# Patient Record
Sex: FEMALE | Employment: FULL TIME | URBAN - METROPOLITAN AREA
[De-identification: names, ages, dates, MRNs, and addresses within clinical notes are randomized per-mention and may not be internally consistent; named-entity substitution may affect disease eponyms.]

---

## 2023-07-14 ENCOUNTER — LAB (OUTPATIENT)
Dept: LAB | Facility: LAB | Age: 43
End: 2023-07-14
Payer: COMMERCIAL

## 2023-07-14 LAB — TOBACCO SCREEN, URINE: NEGATIVE

## 2023-11-16 ENCOUNTER — OFFICE VISIT (OUTPATIENT)
Dept: PRIMARY CARE | Facility: CLINIC | Age: 43
End: 2023-11-16
Payer: COMMERCIAL

## 2023-11-16 VITALS
SYSTOLIC BLOOD PRESSURE: 140 MMHG | DIASTOLIC BLOOD PRESSURE: 68 MMHG | BODY MASS INDEX: 30.26 KG/M2 | TEMPERATURE: 98.1 F | OXYGEN SATURATION: 97 % | WEIGHT: 170.8 LBS | HEIGHT: 63 IN | HEART RATE: 71 BPM

## 2023-11-16 DIAGNOSIS — R30.0 DYSURIA: ICD-10-CM

## 2023-11-16 DIAGNOSIS — F32.A ANXIETY AND DEPRESSION: ICD-10-CM

## 2023-11-16 DIAGNOSIS — F41.9 ANXIETY AND DEPRESSION: ICD-10-CM

## 2023-11-16 DIAGNOSIS — Z00.00 ANNUAL PHYSICAL EXAM: Primary | ICD-10-CM

## 2023-11-16 DIAGNOSIS — K21.9 GASTROESOPHAGEAL REFLUX DISEASE WITHOUT ESOPHAGITIS: ICD-10-CM

## 2023-11-16 DIAGNOSIS — I10 BENIGN HYPERTENSION: ICD-10-CM

## 2023-11-16 LAB
25(OH)D3 SERPL-MCNC: 11 NG/ML (ref 30–100)
ALBUMIN SERPL BCP-MCNC: 4.8 G/DL (ref 3.4–5)
ALP SERPL-CCNC: 52 U/L (ref 33–110)
ALT SERPL W P-5'-P-CCNC: 37 U/L (ref 7–45)
ANION GAP SERPL CALC-SCNC: 17 MMOL/L (ref 10–20)
AST SERPL W P-5'-P-CCNC: 25 U/L (ref 9–39)
BILIRUB SERPL-MCNC: 0.7 MG/DL (ref 0–1.2)
BUN SERPL-MCNC: 11 MG/DL (ref 6–23)
CALCIUM SERPL-MCNC: 9.7 MG/DL (ref 8.6–10.6)
CHLORIDE SERPL-SCNC: 102 MMOL/L (ref 98–107)
CHOLEST SERPL-MCNC: 201 MG/DL (ref 0–199)
CHOLESTEROL/HDL RATIO: 4.4
CO2 SERPL-SCNC: 24 MMOL/L (ref 21–32)
CREAT SERPL-MCNC: 0.71 MG/DL (ref 0.5–1.05)
ERYTHROCYTE [DISTWIDTH] IN BLOOD BY AUTOMATED COUNT: 11.9 % (ref 11.5–14.5)
GFR SERPL CREATININE-BSD FRML MDRD: >90 ML/MIN/1.73M*2
GLUCOSE SERPL-MCNC: 81 MG/DL (ref 74–99)
HCT VFR BLD AUTO: 38.8 % (ref 36–46)
HDLC SERPL-MCNC: 45.6 MG/DL
HGB BLD-MCNC: 12.7 G/DL (ref 12–16)
LDLC SERPL CALC-MCNC: 125 MG/DL
MCH RBC QN AUTO: 29.3 PG (ref 26–34)
MCHC RBC AUTO-ENTMCNC: 32.7 G/DL (ref 32–36)
MCV RBC AUTO: 90 FL (ref 80–100)
NON HDL CHOLESTEROL: 155 MG/DL (ref 0–149)
NRBC BLD-RTO: 0 /100 WBCS (ref 0–0)
PLATELET # BLD AUTO: 328 X10*3/UL (ref 150–450)
POTASSIUM SERPL-SCNC: 4.2 MMOL/L (ref 3.5–5.3)
PROT SERPL-MCNC: 7.9 G/DL (ref 6.4–8.2)
RBC # BLD AUTO: 4.33 X10*6/UL (ref 4–5.2)
SODIUM SERPL-SCNC: 139 MMOL/L (ref 136–145)
TRIGL SERPL-MCNC: 150 MG/DL (ref 0–149)
TSH SERPL-ACNC: 3.03 MIU/L (ref 0.44–3.98)
VLDL: 30 MG/DL (ref 0–40)
WBC # BLD AUTO: 7.8 X10*3/UL (ref 4.4–11.3)

## 2023-11-16 PROCEDURE — 1036F TOBACCO NON-USER: CPT | Performed by: NURSE PRACTITIONER

## 2023-11-16 PROCEDURE — 80053 COMPREHEN METABOLIC PANEL: CPT

## 2023-11-16 PROCEDURE — 3078F DIAST BP <80 MM HG: CPT | Performed by: NURSE PRACTITIONER

## 2023-11-16 PROCEDURE — 85027 COMPLETE CBC AUTOMATED: CPT

## 2023-11-16 PROCEDURE — 99396 PREV VISIT EST AGE 40-64: CPT | Performed by: NURSE PRACTITIONER

## 2023-11-16 PROCEDURE — 3077F SYST BP >= 140 MM HG: CPT | Performed by: NURSE PRACTITIONER

## 2023-11-16 PROCEDURE — 82306 VITAMIN D 25 HYDROXY: CPT

## 2023-11-16 PROCEDURE — 36415 COLL VENOUS BLD VENIPUNCTURE: CPT

## 2023-11-16 PROCEDURE — 83036 HEMOGLOBIN GLYCOSYLATED A1C: CPT

## 2023-11-16 PROCEDURE — 84443 ASSAY THYROID STIM HORMONE: CPT

## 2023-11-16 PROCEDURE — 80061 LIPID PANEL: CPT

## 2023-11-16 RX ORDER — FAMOTIDINE 20 MG/1
20 TABLET, FILM COATED ORAL NIGHTLY PRN
COMMUNITY

## 2023-11-16 ASSESSMENT — ANXIETY QUESTIONNAIRES
2. NOT BEING ABLE TO STOP OR CONTROL WORRYING: MORE THAN HALF THE DAYS
1. FEELING NERVOUS, ANXIOUS, OR ON EDGE: MORE THAN HALF THE DAYS
GAD7 TOTAL SCORE: 11
5. BEING SO RESTLESS THAT IT IS HARD TO SIT STILL: NOT AT ALL
7. FEELING AFRAID AS IF SOMETHING AWFUL MIGHT HAPPEN: MORE THAN HALF THE DAYS
6. BECOMING EASILY ANNOYED OR IRRITABLE: SEVERAL DAYS
3. WORRYING TOO MUCH ABOUT DIFFERENT THINGS: MORE THAN HALF THE DAYS
IF YOU CHECKED OFF ANY PROBLEMS ON THIS QUESTIONNAIRE, HOW DIFFICULT HAVE THESE PROBLEMS MADE IT FOR YOU TO DO YOUR WORK, TAKE CARE OF THINGS AT HOME, OR GET ALONG WITH OTHER PEOPLE: SOMEWHAT DIFFICULT
4. TROUBLE RELAXING: MORE THAN HALF THE DAYS

## 2023-11-16 ASSESSMENT — PATIENT HEALTH QUESTIONNAIRE - PHQ9
1. LITTLE INTEREST OR PLEASURE IN DOING THINGS: MORE THAN HALF THE DAYS
4. FEELING TIRED OR HAVING LITTLE ENERGY: NEARLY EVERY DAY
8. MOVING OR SPEAKING SO SLOWLY THAT OTHER PEOPLE COULD HAVE NOTICED. OR THE OPPOSITE, BEING SO FIGETY OR RESTLESS THAT YOU HAVE BEEN MOVING AROUND A LOT MORE THAN USUAL: SEVERAL DAYS
5. POOR APPETITE OR OVEREATING: MORE THAN HALF THE DAYS
SUM OF ALL RESPONSES TO PHQ QUESTIONS 1-9: 17
3. TROUBLE FALLING OR STAYING ASLEEP OR SLEEPING TOO MUCH: MORE THAN HALF THE DAYS
9. THOUGHTS THAT YOU WOULD BE BETTER OFF DEAD, OR OF HURTING YOURSELF: NOT AT ALL
10. IF YOU CHECKED OFF ANY PROBLEMS, HOW DIFFICULT HAVE THESE PROBLEMS MADE IT FOR YOU TO DO YOUR WORK, TAKE CARE OF THINGS AT HOME, OR GET ALONG WITH OTHER PEOPLE: SOMEWHAT DIFFICULT
SUM OF ALL RESPONSES TO PHQ9 QUESTIONS 1 AND 2: 4
6. FEELING BAD ABOUT YOURSELF - OR THAT YOU ARE A FAILURE OR HAVE LET YOURSELF OR YOUR FAMILY DOWN: NEARLY EVERY DAY
7. TROUBLE CONCENTRATING ON THINGS, SUCH AS READING THE NEWSPAPER OR WATCHING TELEVISION: MORE THAN HALF THE DAYS
2. FEELING DOWN, DEPRESSED OR HOPELESS: MORE THAN HALF THE DAYS

## 2023-11-16 ASSESSMENT — PAIN SCALES - GENERAL: PAINLEVEL: 0-NO PAIN

## 2023-11-16 NOTE — ASSESSMENT & PLAN NOTE
Self stopped Amlodipine 10 mg  Pressures at home and record, bring to next visit  Manage sodium  Handouts provided and reviewed with patient, discussed

## 2023-11-16 NOTE — PATIENT INSTRUCTIONS
Health Maintenance  Continue to follow a healthy diet with fruits and vegetables at most meals.  Daily exercise is recommended as well as adding weights 3 times a week to maintain muscle mass and for bone health.  It is recommended you drink 6 to 8 glasses of water daily, more when you are exerting yourself or in hot weather.  Make sure you follow the health screening guidelines and keep up to date on your immunizations!    GERD or acid reflux occurs when stomach contents back up into the esophagus.Symptoms can include heartburn, chest pain, pain with swallowing, stomach pain, nausea/vomiting, and a sense of a lump in the throat.   Treatment can include medication and/or lifestyle changes such as;   Losing weight if you are overweight  Avoiding foods that trigger symptoms such as caffeine, chocolate, alcohol, citrus, fatty foods, spicy foods, chocolate  Quitting smoking, saliva helps neutralize refluxed acid but  smoking reduces the amount of saliva in the mouth and throat.It also causes coughing which aggravates reflux.  Avoid late meals  Avoid tight clothing around the abdomen  Raise the head of your bed 6 to 8 inches  Avoid laying down 2 hours from mealtime      Anxiety  Experiencing occasional anxiety is a normal part of life.   People with anxiety disorders frequently have intense, excessive and persistent worry and fear about everyday situations. Often, anxiety disorders involve repeated episodes of sudden feelings of intense anxiety and fear or terror that reach a peak within minutes (panic attacks).    These feelings of anxiety and panic interfere with daily activities, are difficult to control, are out of proportion to the actual danger and can last a long time. You may avoid places or situations to prevent these feelings. Symptoms may start during childhood or the teen years and continue into adulthood.    Common anxiety signs and symptoms include:  Feeling nervous, restless or tense  Having a sense of  impending danger, panic or doom  Having an increased heart rate  Breathing rapidly (hyperventilation), Sweating, Trembling  Feeling weak or tired  Trouble concentrating or thinking about anything other than the present worry, Having trouble sleeping  Experiencing gastrointestinal (GI) problems  Having difficulty controlling worry  Having the urge to avoid things that trigger anxiety    Causes  The causes of anxiety disorders aren't fully understood. Life experiences such as traumatic events appear to trigger anxiety disorders in people who are already prone to anxiety. Inherited traits also can be a factor.  For some people, anxiety may be linked to an underlying health issue.     Complications  Having an anxiety disorder does more than make you worry. It can also lead to, or worsen, other mental and physical conditions, such as:  Depression (which often occurs with an anxiety disorder) or other mental health disorders  Substance misuse  Trouble sleeping (insomnia)  Digestive or bowel problems  Headaches and chronic pain  Social isolation  Problems functioning at school or work  Poor quality of life  Suicide    Prevention  There's no way to predict for certain what will cause someone to develop an anxiety disorder, but you can take steps to reduce the impact of symptoms if you're anxious:    Get help early. Anxiety, can be harder to treat if you wait.  Stay active.   Participate in activities that you enjoy and that make you feel good about yourself.   Enjoy social interaction and caring relationships, which can lessen your worries.  Avoid alcohol or drug use.     Treatment  The two main treatments for anxiety disorders are psychotherapy and medications. You may benefit most from a combination of the two. It may take some trial and error to discover which treatments work best for you.    Psychotherapy  Also known as talk therapy or psychological counseling  Cognitive behavioral therapy (CBT) is the most effective  form of psychotherapy for anxiety disorders. Generally a short-term treatment, CBT focuses on teaching you specific skills to improve your symptoms and gradually return to the activities you've avoided because of anxiety.    Medications  Several types of medications are used to help relieve symptoms, depending on the type of anxiety disorder you have and whether you also have other mental or physical health issues. For example:  Certain antidepressants are also used to treat anxiety disorders.  An anti-anxiety medication called buspirone may be prescribed.  In limited circumstances, your doctor may prescribe other types of medications

## 2023-11-16 NOTE — ASSESSMENT & PLAN NOTE
Hx of Zoloft, not currently taking  CBT  Handouts provided and reviewed with patient, discussed  Revisit at follow up

## 2023-11-16 NOTE — PROGRESS NOTES
"Subjective   Patient ID: Monalisa Au is a 43 y.o. female who presents for New Patient Visit and Annual Exam.    HPI  Pleasant 44 y/o female with PMH HTN, Anxiety, HSV, gestational diabetes, GERD (H pylori IgG positive s/p triple therapy resolved)  presents for Annual and to establish care  [place] [facility] [place]    Current concerns include  Dysuria, urinary frequency, discomfort at times, denies blood or discharge. Often wakes up to urinate over last few weeks    HTN- had been prescribed Amlodipine in the past, reports she stopped taking it 1 month ago as she isn't sure she really has hypertension. Takes pressures at home and running in the 120 systolic. Denies chest pain, sob, edema.     Anxiety-Endorses palpitations attributed to anxiety. Cardiac workup completed, Holter monitor completed 10/24/2022, noted occasional PAC's. Reports she does not take medication however EMR review shows Zoloft started in 2021. Denies SI, HI, sleeps well    GERD- (H pylori IgG positive s/p triple therapy resolved) EGD 8/8/2023 with Dr Prater, negative. Treated for reflux with pepcid which she reports works well.  Coffee a trigger. Still with gas although less frequent    Lives with SO  Works as [employer] for , remote  2  Kids ages 18, 9    Diet \"eat everything\"  Little fruit or vegetables  Caffeine no, tries to avoid, worsens gerd  Water 40 oz  Exercise recently joined gym, PF, 3 x week  Non-smoker  Alcohol  No      Eye exam 2022  Dental exam 2021  Gyn Hx abnormal pap 2020  Colonoscopy no  Mammogram Feb 2023    Family history Mom, Dad healthy  Half sister Ovarian cancer         Review of Systems  Review of Systems   Constitutional: Negative.    HENT: Negative.     Respiratory: Negative.     Cardiovascular: Negative.    Gastrointestinal: Negative.    Genitourinary: Negative.    Musculoskeletal: Negative.    Psychiatric/Behavioral: Negative.     All other systems reviewed and are negative.    .vsVisit Vitals  /68 (BP " "Location: Left arm, Patient Position: Sitting, BP Cuff Size: Adult)   Pulse 71   Temp 36.7 °C (98.1 °F) (Temporal)   Ht 1.588 m (5' 2.5\")   Wt 77.5 kg (170 lb 12.8 oz)   LMP 10/18/2023 (Approximate)   SpO2 97%   BMI 30.74 kg/m²   OB Status Having periods   Smoking Status Never   BSA 1.85 m²         Objective   Physical Exam  Physical Exam  Vitals reviewed.   Constitutional:       General: She is active.   HENT:      Head: Normocephalic and atraumatic.   Eyes:      Extraocular Movements: Extraocular movements intact.      Pupils: Pupils are equal, round, and reactive to light.   Cardiovascular:      Rate and Rhythm: Normal rate and regular rhythm.   Pulmonary:      Effort: Pulmonary effort is normal.      Breath sounds: Normal breath sounds.   Abdominal:      General: Bowel sounds are normal.   Musculoskeletal:         General: Normal range of motion.      Cervical back: Neck supple.   Skin:     General: Skin is warm and dry.      Capillary Refill: Capillary refill takes less than 2 seconds.   Neurological:      General: No focal deficit present.      Mental Status: She is alert.     Assessment/Plan   Problem List Items Addressed This Visit       Anxiety and depression     Hx of Zoloft, not currently taking  CBT  Handouts provided and reviewed with patient, discussed  Revisit at follow up         Benign hypertension     Self stopped Amlodipine 10 mg  Pressures at home and record, bring to next visit  Manage sodium  Handouts provided and reviewed with patient, discussed           Relevant Orders    Lipid Panel    Annual physical exam - Primary    Relevant Orders    CBC    Comprehensive Metabolic Panel    Vitamin D 25-Hydroxy,Total (for eval of Vitamin D levels)    TSH with reflex to Free T4 if abnormal    Hemoglobin A1C    Lipid Panel    Dysuria    Relevant Orders    POCT UA (nonautomated) manually resulted    Urinalysis with Reflex Microscopic and Culture    Gastroesophageal reflux disease without esophagitis     " Pepcid  Manage triggers  Handouts provided and reviewed with patient, discussed

## 2023-11-17 DIAGNOSIS — E55.9 VITAMIN D DEFICIENCY: Primary | ICD-10-CM

## 2023-11-17 LAB
EST. AVERAGE GLUCOSE BLD GHB EST-MCNC: 88 MG/DL
HBA1C MFR BLD: 4.7 %

## 2023-11-17 RX ORDER — ERGOCALCIFEROL 1.25 MG/1
50000 CAPSULE ORAL
Qty: 4 CAPSULE | Refills: 3 | Status: SHIPPED | OUTPATIENT
Start: 2023-11-17 | End: 2024-03-08

## 2023-11-30 ENCOUNTER — OFFICE VISIT (OUTPATIENT)
Dept: PRIMARY CARE | Facility: CLINIC | Age: 43
End: 2023-11-30
Payer: COMMERCIAL

## 2023-11-30 VITALS
OXYGEN SATURATION: 97 % | WEIGHT: 172.4 LBS | SYSTOLIC BLOOD PRESSURE: 122 MMHG | TEMPERATURE: 97.8 F | HEART RATE: 71 BPM | DIASTOLIC BLOOD PRESSURE: 64 MMHG | BODY MASS INDEX: 31.03 KG/M2

## 2023-11-30 DIAGNOSIS — I10 BENIGN HYPERTENSION: Primary | ICD-10-CM

## 2023-11-30 DIAGNOSIS — E55.9 VITAMIN D DEFICIENCY: ICD-10-CM

## 2023-11-30 DIAGNOSIS — R30.0 DYSURIA: ICD-10-CM

## 2023-11-30 LAB
POC APPEARANCE, URINE: CLEAR
POC BILIRUBIN, URINE: NEGATIVE
POC BLOOD, URINE: ABNORMAL
POC COLOR, URINE: YELLOW
POC GLUCOSE, URINE: NEGATIVE MG/DL
POC KETONES, URINE: NEGATIVE MG/DL
POC LEUKOCYTES, URINE: ABNORMAL
POC NITRITE,URINE: NEGATIVE
POC PH, URINE: 6.5 PH
POC PROTEIN, URINE: NEGATIVE MG/DL
POC SPECIFIC GRAVITY, URINE: 1.02
POC UROBILINOGEN, URINE: 1 EU/DL

## 2023-11-30 PROCEDURE — 81002 URINALYSIS NONAUTO W/O SCOPE: CPT | Performed by: NURSE PRACTITIONER

## 2023-11-30 PROCEDURE — 3074F SYST BP LT 130 MM HG: CPT | Performed by: NURSE PRACTITIONER

## 2023-11-30 PROCEDURE — 87086 URINE CULTURE/COLONY COUNT: CPT

## 2023-11-30 PROCEDURE — 99214 OFFICE O/P EST MOD 30 MIN: CPT | Performed by: NURSE PRACTITIONER

## 2023-11-30 PROCEDURE — 81001 URINALYSIS AUTO W/SCOPE: CPT

## 2023-11-30 PROCEDURE — 3044F HG A1C LEVEL LT 7.0%: CPT | Performed by: NURSE PRACTITIONER

## 2023-11-30 PROCEDURE — 1036F TOBACCO NON-USER: CPT | Performed by: NURSE PRACTITIONER

## 2023-11-30 PROCEDURE — 3078F DIAST BP <80 MM HG: CPT | Performed by: NURSE PRACTITIONER

## 2023-11-30 PROCEDURE — 3049F LDL-C 100-129 MG/DL: CPT | Performed by: NURSE PRACTITIONER

## 2023-11-30 ASSESSMENT — PAIN SCALES - GENERAL: PAINLEVEL: 0-NO PAIN

## 2023-11-30 ASSESSMENT — ENCOUNTER SYMPTOMS
RESPIRATORY NEGATIVE: 1
CARDIOVASCULAR NEGATIVE: 1
GASTROINTESTINAL NEGATIVE: 1
CONSTITUTIONAL NEGATIVE: 1

## 2023-11-30 NOTE — PROGRESS NOTES
Subjective   Patient ID: Monalisa Au is a 43 y.o. female who presents for Follow-up.    HPI   Pleasant established here for follow up on blood pressures. Had self stopped her amlodipine in the past. Pressures today are good, running in the 120-130 systolic at home. Plan is to continue weekly home monitoring, refer to Nutritionist for healthy diet counseling, avoid sodium and reassess in th future. Will hold off on medication for now    Reviewed labs, lipids, discussed lifestyle changes which she will also discuss with Nutrition    Snores-reports daytime sleepiness, told she can be heard throughout the house, unsure if she has apnea episodes at night    Vit D def- started supplement, reports feeling better, less fatigue    At last visit c/o dysuria, left without leaving sample. Obtain today    Review of Systems   Constitutional: Negative.    Respiratory: Negative.     Cardiovascular: Negative.    Gastrointestinal: Negative.    All other systems reviewed and are negative.      Objective   /64 (BP Location: Left arm, Patient Position: Sitting, BP Cuff Size: Large adult)   Pulse 71   Temp 36.6 °C (97.8 °F) (Temporal)   Wt 78.2 kg (172 lb 6.4 oz)   LMP 11/22/2023 (Exact Date)   SpO2 97%   BMI 31.03 kg/m²     Physical Exam  Vitals reviewed.   Cardiovascular:      Rate and Rhythm: Regular rhythm.   Pulmonary:      Effort: Pulmonary effort is normal.      Breath sounds: Normal breath sounds.   Musculoskeletal:         General: Normal range of motion.      Cervical back: Neck supple.   Neurological:      Mental Status: She is alert.   Psychiatric:         Mood and Affect: Mood normal.         Assessment/Plan   Problem List Items Addressed This Visit             ICD-10-CM    Benign hypertension - Primary I10     Weekly bp check  Limit sodium         Relevant Orders    Referral to Nutrition Services    Dysuria R30.0    Relevant Orders    POCT UA (nonautomated) manually resulted (Completed)    Vitamin D  deficiency E55.9     Supplemented

## 2023-11-30 NOTE — PATIENT INSTRUCTIONS
Your Vitamin D level is deficient. A script has been sent to your pharmacy, when completed you may take an over the counter Vitamin D supplement, 2000 units daily or a Multi Vitamin with D. Look for the USP label so you know the supplement is verified to be pure and not fillers.   If you chooses not to take a supplement look for fortified foods like OJ, Soy milk, Cereal or natural sources Egg yolks, Enders, Canned tuna, mushrooms, Sardines,    Health Maintenance  Continue to follow a healthy diet with fruits and vegetables at most meals.  Daily exercise is recommended as well as adding weights 3 times a week to maintain muscle mass and for bone health.  It is recommended you drink 6 to 8 glasses of water daily, more when you are exerting yourself or in hot weather.  Make sure you follow the health screening guidelines and keep up to date on your immunizations!    The American College of Cardiology  and American Heart Association classify Blood Pressure as follows;   Normal   Systolic <120 mmHg  and Diastolic < 80 mmHg     You are encouraged to follow a heart healthy diet, read food labels and limit sodium to 2-4 gram per day. Avoid lunch meats, processed meats like armstrong and hot dogs, and processed foods which are high in added salt. Try to limit all sources of caffeine, and increase your intake of water, fruits and vegetables. Daily exercise and not smoking are also good ways to lower blood pressure.    If you are prescribed medication take it at the same time every day and do not skip doses.    If you check your blood pressure at home perform at the same time every day and under the same conditions. Both feet flat on floor and left arm at heart level for the most accurate reading. Bring your numbers with you to your next visit.

## 2023-12-01 LAB
APPEARANCE UR: CLEAR
BILIRUB UR STRIP.AUTO-MCNC: NEGATIVE MG/DL
COLOR UR: YELLOW
GLUCOSE UR STRIP.AUTO-MCNC: NEGATIVE MG/DL
HOLD SPECIMEN: NORMAL
KETONES UR STRIP.AUTO-MCNC: NEGATIVE MG/DL
LEUKOCYTE ESTERASE UR QL STRIP.AUTO: ABNORMAL
MUCOUS THREADS #/AREA URNS AUTO: NORMAL /LPF
NITRITE UR QL STRIP.AUTO: NEGATIVE
PH UR STRIP.AUTO: 6 [PH]
PROT UR STRIP.AUTO-MCNC: NEGATIVE MG/DL
RBC # UR STRIP.AUTO: ABNORMAL /UL
RBC #/AREA URNS AUTO: NORMAL /HPF
SP GR UR STRIP.AUTO: 1.01
SQUAMOUS #/AREA URNS AUTO: NORMAL /HPF
UROBILINOGEN UR STRIP.AUTO-MCNC: 2 MG/DL
WBC #/AREA URNS AUTO: NORMAL /HPF

## 2023-12-02 LAB — BACTERIA UR CULT: NO GROWTH

## 2024-03-06 ENCOUNTER — APPOINTMENT (OUTPATIENT)
Dept: OBSTETRICS AND GYNECOLOGY | Facility: CLINIC | Age: 44
End: 2024-03-06
Payer: COMMERCIAL

## 2024-04-03 ENCOUNTER — LAB (OUTPATIENT)
Dept: LAB | Facility: LAB | Age: 44
End: 2024-04-03
Payer: COMMERCIAL

## 2024-04-03 ENCOUNTER — OFFICE VISIT (OUTPATIENT)
Dept: OBSTETRICS AND GYNECOLOGY | Facility: CLINIC | Age: 44
End: 2024-04-03
Payer: COMMERCIAL

## 2024-04-03 VITALS
SYSTOLIC BLOOD PRESSURE: 185 MMHG | HEART RATE: 80 BPM | WEIGHT: 173 LBS | OXYGEN SATURATION: 96 % | DIASTOLIC BLOOD PRESSURE: 98 MMHG | BODY MASS INDEX: 31.14 KG/M2

## 2024-04-03 DIAGNOSIS — R39.15 URINARY URGENCY: ICD-10-CM

## 2024-04-03 DIAGNOSIS — N93.9 ABNORMAL UTERINE BLEEDING (AUB): ICD-10-CM

## 2024-04-03 DIAGNOSIS — Z12.31 ENCOUNTER FOR SCREENING MAMMOGRAM FOR MALIGNANT NEOPLASM OF BREAST: ICD-10-CM

## 2024-04-03 DIAGNOSIS — R10.2 PELVIC PAIN: ICD-10-CM

## 2024-04-03 DIAGNOSIS — Z11.3 SCREENING FOR STD (SEXUALLY TRANSMITTED DISEASE): ICD-10-CM

## 2024-04-03 DIAGNOSIS — B00.9 HSV-2 INFECTION: ICD-10-CM

## 2024-04-03 DIAGNOSIS — N93.9 ABNORMAL UTERINE BLEEDING (AUB): Primary | ICD-10-CM

## 2024-04-03 LAB
POC BILIRUBIN, URINE: NEGATIVE
POC BLOOD, URINE: ABNORMAL
POC GLUCOSE, URINE: NEGATIVE MG/DL
POC KETONES, URINE: NEGATIVE MG/DL
POC LEUKOCYTES, URINE: NEGATIVE
POC NITRITE,URINE: NEGATIVE
POC PH, URINE: 7 PH
POC PROTEIN, URINE: ABNORMAL MG/DL
POC SPECIFIC GRAVITY, URINE: 1.01
POC UROBILINOGEN, URINE: 0.2 EU/DL

## 2024-04-03 PROCEDURE — 87340 HEPATITIS B SURFACE AG IA: CPT

## 2024-04-03 PROCEDURE — 86780 TREPONEMA PALLIDUM: CPT

## 2024-04-03 PROCEDURE — 87800 DETECT AGNT MULT DNA DIREC: CPT

## 2024-04-03 PROCEDURE — 87661 TRICHOMONAS VAGINALIS AMPLIF: CPT

## 2024-04-03 PROCEDURE — 87086 URINE CULTURE/COLONY COUNT: CPT

## 2024-04-03 PROCEDURE — 3077F SYST BP >= 140 MM HG: CPT | Performed by: OBSTETRICS & GYNECOLOGY

## 2024-04-03 PROCEDURE — 36415 COLL VENOUS BLD VENIPUNCTURE: CPT

## 2024-04-03 PROCEDURE — 3080F DIAST BP >= 90 MM HG: CPT | Performed by: OBSTETRICS & GYNECOLOGY

## 2024-04-03 PROCEDURE — 99205 OFFICE O/P NEW HI 60 MIN: CPT | Performed by: OBSTETRICS & GYNECOLOGY

## 2024-04-03 PROCEDURE — 86803 HEPATITIS C AB TEST: CPT

## 2024-04-03 PROCEDURE — 81003 URINALYSIS AUTO W/O SCOPE: CPT | Performed by: OBSTETRICS & GYNECOLOGY

## 2024-04-03 PROCEDURE — 87389 HIV-1 AG W/HIV-1&-2 AB AG IA: CPT

## 2024-04-03 RX ORDER — VALACYCLOVIR HYDROCHLORIDE 500 MG/1
500 TABLET, FILM COATED ORAL 2 TIMES DAILY
Qty: 6 TABLET | Refills: 5 | Status: SHIPPED | OUTPATIENT
Start: 2024-04-03 | End: 2024-04-06

## 2024-04-03 RX ORDER — VALACYCLOVIR HYDROCHLORIDE 500 MG/1
500 TABLET, FILM COATED ORAL 2 TIMES DAILY
Qty: 6 TABLET | Refills: 0 | Status: SHIPPED | OUTPATIENT
Start: 2024-04-03 | End: 2024-04-03 | Stop reason: SDUPTHER

## 2024-04-03 ASSESSMENT — PAIN SCALES - GENERAL: PAINLEVEL: 4

## 2024-04-03 ASSESSMENT — PATIENT HEALTH QUESTIONNAIRE - PHQ9
1. LITTLE INTEREST OR PLEASURE IN DOING THINGS: SEVERAL DAYS
SUM OF ALL RESPONSES TO PHQ9 QUESTIONS 1 & 2: 2
10. IF YOU CHECKED OFF ANY PROBLEMS, HOW DIFFICULT HAVE THESE PROBLEMS MADE IT FOR YOU TO DO YOUR WORK, TAKE CARE OF THINGS AT HOME, OR GET ALONG WITH OTHER PEOPLE: SOMEWHAT DIFFICULT
2. FEELING DOWN, DEPRESSED OR HOPELESS: SEVERAL DAYS

## 2024-04-03 NOTE — PROGRESS NOTES
GYN PROGRESS NOTE          Chief complaint: new patient    HPI:    HPI    Monalisa is here today for annual exam.    Complaints:  white/mucous discharge; vaginal odor-fishy.   Itching and pain near the vaginal opening,   urinary urgency.    Right side of the vaginal opening, she notes a pimple size lesion all the time.   LMP: 4/3/24 cycles lasting longer (11 d), normal in flow.   LAST PAP: 6/28/21 NEG/NEG  ABN PAP HX: in her 20's cryo x2 (1999)   LAST MAMMOGRAM: 2/16/23  ABN MAMMOGRAM HX:  CONTRACEPTION: tubal ligation   SEXUAL ACTIVITY: monogamous male partner; denies pain/bleeding   STI SCREENING: accepts   COLONOSCOPY:    HX of HSV; would like refills of medication last outbreak was 1/24.   Her visit was chaperoned by: JUNIOR DASILVA       Last edited by Amira Arita RN on 4/3/2024 11:53 AM.          Reviewed case with patient, reviewed plans.    No current herpes lesions would like refill  Having extended periods heavy at times    This is a new pattern    Has 2 years with a small white bump on her vulva no change no pain no bleeding    Never been treated for bacterial vaginosis    ROS:  GEN - no fevers or chills  RESP - no SOB or cough  GYN - see HPI      HISTORY:  No past medical history on file.  Past Surgical History:   Procedure Laterality Date    HYSTERECTOMY  05/2014     Social History     Socioeconomic History    Marital status: Single     Spouse name: Not on file    Number of children: Not on file    Years of education: Not on file    Highest education level: Not on file   Occupational History    Not on file   Tobacco Use    Smoking status: Never    Smokeless tobacco: Never   Vaping Use    Vaping Use: Never used   Substance and Sexual Activity    Alcohol use: Never    Drug use: Never    Sexual activity: Defer   Other Topics Concern    Not on file   Social History Narrative    Not on file     Social Determinants of Health     Financial Resource Strain: Not on file   Food Insecurity: Not on file    Transportation Needs: Not on file   Physical Activity: Not on file   Stress: Not on file   Social Connections: Not on file   Intimate Partner Violence: Not on file   Housing Stability: Not on file     Cancer-related family history is not on file.       PHYSICAL EXAM:  BP (!) 185/98 (BP Location: Left arm, Patient Position: Sitting, BP Cuff Size: Adult long)   Pulse 80   Wt 78.5 kg (173 lb)   LMP 04/03/2024   SpO2 96%   BMI 31.14 kg/m²   GEN:  A&O, NAD  HEENT:  head HC/AT, no visible goiter  PSYCH:  normal affect, non-anxious      IMPRESSION/PLAN:    44-year-old abnormal uterine bleeding bacterial vaginosis history of HSV    Valacyclovir as needed  Pelvic ultrasound  Comprehensive STD panel  Discuss options for bleeding and review ultrasound at next visit        Roland Ge MD

## 2024-04-04 LAB
BACTERIA UR CULT: NORMAL
C TRACH RRNA SPEC QL NAA+PROBE: NEGATIVE
HBV SURFACE AG SERPL QL IA: NONREACTIVE
HCV AB SER QL: NONREACTIVE
HIV 1+2 AB+HIV1 P24 AG SERPL QL IA: NONREACTIVE
N GONORRHOEA DNA SPEC QL PROBE+SIG AMP: NEGATIVE
T VAGINALIS RRNA SPEC QL NAA+PROBE: NEGATIVE
TREPONEMA PALLIDUM IGG+IGM AB [PRESENCE] IN SERUM OR PLASMA BY IMMUNOASSAY: NONREACTIVE

## 2024-04-15 ENCOUNTER — OFFICE VISIT (OUTPATIENT)
Dept: PRIMARY CARE | Facility: CLINIC | Age: 44
End: 2024-04-15
Payer: COMMERCIAL

## 2024-04-15 VITALS
TEMPERATURE: 98.2 F | HEART RATE: 70 BPM | SYSTOLIC BLOOD PRESSURE: 144 MMHG | BODY MASS INDEX: 30.96 KG/M2 | WEIGHT: 172 LBS | OXYGEN SATURATION: 97 % | DIASTOLIC BLOOD PRESSURE: 82 MMHG

## 2024-04-15 DIAGNOSIS — R06.83 SNORES: ICD-10-CM

## 2024-04-15 DIAGNOSIS — R06.81 APNEA: ICD-10-CM

## 2024-04-15 DIAGNOSIS — I10 BENIGN HYPERTENSION: Primary | ICD-10-CM

## 2024-04-15 PROCEDURE — 99213 OFFICE O/P EST LOW 20 MIN: CPT | Performed by: NURSE PRACTITIONER

## 2024-04-15 PROCEDURE — 3079F DIAST BP 80-89 MM HG: CPT | Performed by: NURSE PRACTITIONER

## 2024-04-15 PROCEDURE — 3077F SYST BP >= 140 MM HG: CPT | Performed by: NURSE PRACTITIONER

## 2024-04-15 RX ORDER — AMLODIPINE BESYLATE 5 MG/1
5 TABLET ORAL DAILY
Qty: 30 TABLET | Refills: 5 | Status: SHIPPED | OUTPATIENT
Start: 2024-04-15 | End: 2024-10-12

## 2024-04-15 ASSESSMENT — PATIENT HEALTH QUESTIONNAIRE - PHQ9
SUM OF ALL RESPONSES TO PHQ9 QUESTIONS 1 AND 2: 0
2. FEELING DOWN, DEPRESSED OR HOPELESS: NOT AT ALL
1. LITTLE INTEREST OR PLEASURE IN DOING THINGS: NOT AT ALL

## 2024-04-15 ASSESSMENT — PAIN SCALES - GENERAL: PAINLEVEL: 0-NO PAIN

## 2024-04-15 NOTE — PATIENT INSTRUCTIONS
A prescription was sent to your pharmacy. Your pharmacist can answer any questions or concerns you may have or you may call the office.    The American College of Cardiology  and American Heart Association classify Blood Pressure as follows;   Normal   Systolic <120 mmHg  and Diastolic < 80 mmHg     Elevated:  Systolic 120-129 mmHg  and  Diastolic <80 mmHg     Stage 1 Hypertension:   Systolic 130-139 mmHg  and/or  Diastolic 80-89 mmHg     Stage 2 Hypertension:    Systolic >140 mmHg   and/or  Diastolic >90 mmHg     Hypertensive Crisis  Systolic Higher than 180 and Diastolic Higher than 120     You are encouraged to follow a heart healthy diet, read food labels and limit sodium to 2-4 gram per day. Avoid lunch meats, processed meats like armstrong and hot dogs, and processed foods which are high in added salt. Try to limit all sources of caffeine, and increase your intake of water, fruits and vegetables. Daily exercise and not smoking are also good ways to lower blood pressure.    If you are prescribed medication take it at the same time every day and do not skip doses.    If you check your blood pressure at home perform at the same time every day and under the same conditions. Both feet flat on floor and left arm at heart level for the most accurate reading. Bring your numbers with you to your next visit.

## 2024-04-15 NOTE — PROGRESS NOTES
Subjective   Patient ID: Monalisa Au is a 44 y.o. female who presents for Hypertension (Bp has been high for a week).    HPI   Pleasant established here for follow up on blood pressures. Had self stopped her amlodipine in the past. Hoping to manage with healthy lifestyle. Reports that her pressures have been slowly worsening, checks at home and running 140's/80-90's. Recently at Gyn appointment and bp 185/98. Denies chest pain, dyspnea, diaphoresis, headache.     Plan is to restart Norvasc today, continue home monitoring, avoid sodium and reassess in th future.     Snores-reports daytime sleepiness, told she can be heard throughout the house, unsure if she has apnea episodes at night. Sleep medicine referral sent     Review of Systems  Review of Systems   Constitutional: Negative.    HENT: Negative.     Respiratory: Negative.     Cardiovascular: Negative.    Gastrointestinal: Negative.    Genitourinary: Negative.    Musculoskeletal: Negative.    Psychiatric/Behavioral: Negative.     All other systems reviewed and are negative.    Objective   /82 (BP Location: Left arm, Patient Position: Sitting)   Pulse 70   Temp 36.8 °C (98.2 °F)   Wt 78 kg (172 lb)   LMP 04/03/2024   SpO2 97%   BMI 30.96 kg/m²     Physical Exam  Physical Exam  Vitals reviewed.   Constitutional:       General: She is active.   HENT:      Head: Normocephalic and atraumatic.   Cardiovascular:      Rate and Rhythm: Normal rate and regular rhythm.   Pulmonary:      Effort: Pulmonary effort is normal.      Breath sounds: Normal breath sounds.   Musculoskeletal:         General: Normal range of motion.      Cervical back: Neck supple.   Neurological:      General: No focal deficit present.      Mental Status: She is alert.     Assessment/Plan   Problem List Items Addressed This Visit             ICD-10-CM    Benign hypertension - Primary I10    Relevant Medications    amLODIPine (Norvasc) 5 mg tablet    Snores R06.83    Relevant Orders     Referral to Adult Sleep Medicine     Other Visit Diagnoses         Codes    Apnea     R06.81    Relevant Orders    Referral to Adult Sleep Medicine

## 2024-04-18 ENCOUNTER — HOSPITAL ENCOUNTER (OUTPATIENT)
Dept: RADIOLOGY | Facility: CLINIC | Age: 44
Discharge: HOME | End: 2024-04-18
Payer: COMMERCIAL

## 2024-04-18 ENCOUNTER — APPOINTMENT (OUTPATIENT)
Dept: RADIOLOGY | Facility: CLINIC | Age: 44
End: 2024-04-18
Payer: COMMERCIAL

## 2024-04-18 VITALS — HEIGHT: 62 IN | BODY MASS INDEX: 31.28 KG/M2 | WEIGHT: 170 LBS

## 2024-04-18 DIAGNOSIS — Z12.31 ENCOUNTER FOR SCREENING MAMMOGRAM FOR MALIGNANT NEOPLASM OF BREAST: ICD-10-CM

## 2024-04-18 PROCEDURE — 77063 BREAST TOMOSYNTHESIS BI: CPT | Performed by: RADIOLOGY

## 2024-04-18 PROCEDURE — 77067 SCR MAMMO BI INCL CAD: CPT

## 2024-04-18 PROCEDURE — 77067 SCR MAMMO BI INCL CAD: CPT | Performed by: RADIOLOGY

## 2024-04-19 ENCOUNTER — HOSPITAL ENCOUNTER (OUTPATIENT)
Dept: RADIOLOGY | Facility: EXTERNAL LOCATION | Age: 44
Discharge: HOME | End: 2024-04-19

## 2024-04-19 ENCOUNTER — HOSPITAL ENCOUNTER (OUTPATIENT)
Dept: RADIOLOGY | Facility: CLINIC | Age: 44
Discharge: HOME | End: 2024-04-19
Payer: COMMERCIAL

## 2024-04-19 DIAGNOSIS — N93.9 ABNORMAL UTERINE BLEEDING (AUB): ICD-10-CM

## 2024-04-19 PROCEDURE — 76856 US EXAM PELVIC COMPLETE: CPT | Performed by: RADIOLOGY

## 2024-04-19 PROCEDURE — 76830 TRANSVAGINAL US NON-OB: CPT | Performed by: RADIOLOGY

## 2024-04-19 PROCEDURE — 76856 US EXAM PELVIC COMPLETE: CPT

## 2024-04-30 ENCOUNTER — APPOINTMENT (OUTPATIENT)
Dept: PRIMARY CARE | Facility: CLINIC | Age: 44
End: 2024-04-30
Payer: COMMERCIAL

## 2024-05-01 ENCOUNTER — PREP FOR PROCEDURE (OUTPATIENT)
Dept: OBSTETRICS AND GYNECOLOGY | Facility: CLINIC | Age: 44
End: 2024-05-01

## 2024-05-01 ENCOUNTER — OFFICE VISIT (OUTPATIENT)
Dept: OBSTETRICS AND GYNECOLOGY | Facility: CLINIC | Age: 44
End: 2024-05-01
Payer: COMMERCIAL

## 2024-05-01 ENCOUNTER — APPOINTMENT (OUTPATIENT)
Dept: OBSTETRICS AND GYNECOLOGY | Facility: CLINIC | Age: 44
End: 2024-05-01
Payer: COMMERCIAL

## 2024-05-01 VITALS — SYSTOLIC BLOOD PRESSURE: 163 MMHG | BODY MASS INDEX: 31.46 KG/M2 | WEIGHT: 172 LBS | DIASTOLIC BLOOD PRESSURE: 90 MMHG

## 2024-05-01 DIAGNOSIS — B96.89 BACTERIAL VAGINOSIS: ICD-10-CM

## 2024-05-01 DIAGNOSIS — N76.0 BACTERIAL VAGINOSIS: ICD-10-CM

## 2024-05-01 DIAGNOSIS — N93.9 ABNORMAL UTERINE BLEEDING (AUB): Primary | ICD-10-CM

## 2024-05-01 PROCEDURE — 99215 OFFICE O/P EST HI 40 MIN: CPT | Performed by: OBSTETRICS & GYNECOLOGY

## 2024-05-01 PROCEDURE — 3080F DIAST BP >= 90 MM HG: CPT | Performed by: OBSTETRICS & GYNECOLOGY

## 2024-05-01 PROCEDURE — 3077F SYST BP >= 140 MM HG: CPT | Performed by: OBSTETRICS & GYNECOLOGY

## 2024-05-01 RX ORDER — TRANEXAMIC ACID 650 MG/1
1300 TABLET ORAL ONCE
OUTPATIENT
Start: 2024-05-01 | End: 2024-05-01

## 2024-05-01 RX ORDER — GABAPENTIN 600 MG/1
600 TABLET ORAL ONCE
OUTPATIENT
Start: 2024-05-01 | End: 2024-05-01

## 2024-05-01 RX ORDER — ACETAMINOPHEN 325 MG/1
975 TABLET ORAL ONCE
OUTPATIENT
Start: 2024-05-01 | End: 2024-05-01

## 2024-05-01 RX ORDER — CELECOXIB 400 MG/1
400 CAPSULE ORAL ONCE
OUTPATIENT
Start: 2024-05-01 | End: 2024-05-01

## 2024-05-01 RX ORDER — METRONIDAZOLE 500 MG/1
500 TABLET ORAL 2 TIMES DAILY
Qty: 14 TABLET | Refills: 0 | Status: SHIPPED | OUTPATIENT
Start: 2024-05-01 | End: 2024-05-08

## 2024-05-01 RX ORDER — SODIUM CHLORIDE, SODIUM LACTATE, POTASSIUM CHLORIDE, CALCIUM CHLORIDE 600; 310; 30; 20 MG/100ML; MG/100ML; MG/100ML; MG/100ML
100 INJECTION, SOLUTION INTRAVENOUS CONTINUOUS
OUTPATIENT
Start: 2024-05-01

## 2024-05-01 ASSESSMENT — PAIN SCALES - GENERAL: PAINLEVEL: 0-NO PAIN

## 2024-05-02 NOTE — PROGRESS NOTES
GYN PROGRESS NOTE          Chief complaint: follow up    HPI:  Patient answers are not available for this visit.  HPI    Monalisa is here today for follow up.  Her last appointment was 4/3/2024 for AUB.  She completed her pelvic US 4/19/24.  Today, she has questions regarding her mother's new diagnosis of breast cancer at the age of 71.  She states that she increased urination. She has vaginal irritation and odor, and noting bloating more often.   Chaperone: BROWN Tyson        Last edited by Amira Arita RN on 5/1/2024  8:42 AM.      - Menstrual cycles are not heavy but last for 10 days.      Reviewed case with patient, reviewed plans.    Discussed test result which were normal.    Independent review of ultrasound indicated a small uterus, possible adenomyosis, and either a small uterine polyp or thickening of the endometrial wall.    Discussed treatment options which include hysteroscopy endometrial biopsy with a hormonal IUD, endometrial ablation, or hysterectomy.    ROS:  GEN - no fevers or chills  RESP - no SOB or cough  GYN - see HPI      HISTORY:  No past medical history on file.  Past Surgical History:   Procedure Laterality Date    HYSTERECTOMY  05/2014     Social History     Socioeconomic History    Marital status: Single     Spouse name: Not on file    Number of children: Not on file    Years of education: Not on file    Highest education level: Not on file   Occupational History    Not on file   Tobacco Use    Smoking status: Never    Smokeless tobacco: Never   Vaping Use    Vaping status: Never Used   Substance and Sexual Activity    Alcohol use: Never    Drug use: Never    Sexual activity: Defer   Other Topics Concern    Not on file   Social History Narrative    Not on file     Social Determinants of Health     Financial Resource Strain: High Risk (8/19/2023)    Received from "[employer]    Overall Financial Resource Strain (CARDIA)     Difficulty of Paying Living Expenses: Hard   Food Insecurity: Food  Insecurity Present (8/19/2023)    Received from [facility]    Hunger Vital Sign     Worried About Running Out of Food in the Last Year: Sometimes true     Ran Out of Food in the Last Year: Sometimes true   Transportation Needs: No Transportation Needs (8/19/2023)    Received from [facility]    PRAPARE - Transportation     Lack of Transportation (Medical): No     Lack of Transportation (Non-Medical): No   Physical Activity: Inactive (8/19/2023)    Received from [facility]    Exercise Vital Sign     Days of Exercise per Week: 0 days     Minutes of Exercise per Session: 0 min   Stress: Stress Concern Present (8/19/2023)    Received from [facility]    [employer] [place] [employer] - Occupational Stress Questionnaire     Feeling of Stress : Rather much   Social Connections: Moderately Integrated (8/19/2023)    Received from [facility]    Social Connection and Isolation Panel [NHANES]     Frequency of Communication with Friends and Family: Three times a week     Frequency of Social Gatherings with Friends and Family: Once a week     Attends Gnosticist Services: 1 to 4 times per year     Active Member of Clubs or Organizations: No     Attends Club or Organization Meetings: Never     Marital Status: Living with partner   Intimate Partner Violence: Not At Risk (8/19/2023)    Received from [facility]    Humiliation, Afraid, Rape, and Kick questionnaire     Fear of Current or Ex-Partner: No     Emotionally Abused: No     Physically Abused: No     Sexually Abused: No   Housing Stability: Low Risk  (8/19/2023)    Received from [facility]    Housing Stability Vital Sign     Unable to Pay for Housing in the Last Year: No     Number of Places Lived in the Last Year: 1     Unstable Housing in the Last Year: No     Cancer-related family history includes Ovarian cancer in her half-sister.       PHYSICAL EXAM:  /90 (BP Location: Left arm, Patient Position: Sitting, BP Cuff Size: Adult)   Wt 78 kg (172 lb)   LMP  04/03/2024   BMI 31.46 kg/m²   GEN:  A&O, NAD  HEENT:  head HC/AT, no visible goiter  PSYCH:  normal affect, non-anxious      IMPRESSION/PLAN:  44-year-old AUB and bacterial vaginosis.    - Metronidazole  - Hysteroscopy endometrial biopsy  - Consider hormonal IUD or endometrial ablation    Follow up as scheduled    Christopher DICKENS am scribing for virtually, and in the presence of Dr. Roland Ge on  05/01/24 at 9:11 PM  Roland Ge MD

## 2024-05-09 ENCOUNTER — APPOINTMENT (OUTPATIENT)
Dept: PRIMARY CARE | Facility: CLINIC | Age: 44
End: 2024-05-09
Payer: COMMERCIAL

## 2024-05-29 ENCOUNTER — OFFICE VISIT (OUTPATIENT)
Dept: PRIMARY CARE | Facility: CLINIC | Age: 44
End: 2024-05-29
Payer: COMMERCIAL

## 2024-05-29 VITALS
BODY MASS INDEX: 31.72 KG/M2 | DIASTOLIC BLOOD PRESSURE: 68 MMHG | SYSTOLIC BLOOD PRESSURE: 128 MMHG | OXYGEN SATURATION: 97 % | HEART RATE: 89 BPM | TEMPERATURE: 97.7 F | WEIGHT: 173.4 LBS

## 2024-05-29 DIAGNOSIS — R35.0 URINARY FREQUENCY: ICD-10-CM

## 2024-05-29 DIAGNOSIS — R06.83 SNORES: ICD-10-CM

## 2024-05-29 DIAGNOSIS — I10 BENIGN HYPERTENSION: Primary | ICD-10-CM

## 2024-05-29 LAB
APPEARANCE UR: CLEAR
BILIRUB UR STRIP.AUTO-MCNC: NEGATIVE MG/DL
COLOR UR: ABNORMAL
GLUCOSE UR STRIP.AUTO-MCNC: NORMAL MG/DL
KETONES UR STRIP.AUTO-MCNC: NEGATIVE MG/DL
LEUKOCYTE ESTERASE UR QL STRIP.AUTO: ABNORMAL
NITRITE UR QL STRIP.AUTO: NEGATIVE
PH UR STRIP.AUTO: 6 [PH]
POC APPEARANCE, URINE: CLEAR
POC BILIRUBIN, URINE: NEGATIVE
POC BLOOD, URINE: ABNORMAL
POC COLOR, URINE: YELLOW
POC GLUCOSE, URINE: NEGATIVE MG/DL
POC KETONES, URINE: NEGATIVE MG/DL
POC LEUKOCYTES, URINE: ABNORMAL
POC NITRITE,URINE: NEGATIVE
POC PH, URINE: 6.5 PH
POC PROTEIN, URINE: NEGATIVE MG/DL
POC SPECIFIC GRAVITY, URINE: 1.02
POC UROBILINOGEN, URINE: 0.2 EU/DL
PROT UR STRIP.AUTO-MCNC: NEGATIVE MG/DL
RBC # UR STRIP.AUTO: ABNORMAL /UL
RBC #/AREA URNS AUTO: ABNORMAL /HPF
SP GR UR STRIP.AUTO: 1.01
SQUAMOUS #/AREA URNS AUTO: ABNORMAL /HPF
UROBILINOGEN UR STRIP.AUTO-MCNC: NORMAL MG/DL
WBC #/AREA URNS AUTO: ABNORMAL /HPF

## 2024-05-29 PROCEDURE — 81002 URINALYSIS NONAUTO W/O SCOPE: CPT | Performed by: NURSE PRACTITIONER

## 2024-05-29 PROCEDURE — 99213 OFFICE O/P EST LOW 20 MIN: CPT | Performed by: NURSE PRACTITIONER

## 2024-05-29 PROCEDURE — 81001 URINALYSIS AUTO W/SCOPE: CPT

## 2024-05-29 PROCEDURE — 87086 URINE CULTURE/COLONY COUNT: CPT

## 2024-05-29 PROCEDURE — 3078F DIAST BP <80 MM HG: CPT | Performed by: NURSE PRACTITIONER

## 2024-05-29 PROCEDURE — 3074F SYST BP LT 130 MM HG: CPT | Performed by: NURSE PRACTITIONER

## 2024-05-29 ASSESSMENT — PATIENT HEALTH QUESTIONNAIRE - PHQ9
2. FEELING DOWN, DEPRESSED OR HOPELESS: NOT AT ALL
SUM OF ALL RESPONSES TO PHQ9 QUESTIONS 1 AND 2: 0
1. LITTLE INTEREST OR PLEASURE IN DOING THINGS: NOT AT ALL

## 2024-05-29 ASSESSMENT — PAIN SCALES - GENERAL: PAINLEVEL: 0-NO PAIN

## 2024-05-29 NOTE — PATIENT INSTRUCTIONS
Bladder Irritants   About this topic   Some foods and drinks may irritate your bladder. These may make your interstitial cystitis or incontinence worse. They may also make other bladder problems worse.  There are a lot of things on this list. However, it does not mean that you may never have these foods or drinks. Limiting them to small amounts may help make your bladder problems better. Start by taking away 1 or 2 things on the list and see if it helps. If needed, limit or take away other things to help you feel better.  General   These things may irritate your bladder:  Beer, wine, and mixed drinks (alcohol)  Chocolate  Food additives like MSG  Fruits, like:  Cranberries  Myra  Oranges  Grapefruit  Pineapple  Strawberries  Fruit juices, like:  Cranberry  Grapefruit  Lemon  Pineapple  Orange  Drinks with or without caffeine, like:  Coffee  Teas, including herbal teas  Dark sodas  Carbonated drinks  Legumes, like soybeans and other soy products, such as soy sauce and tofu  Mayonnaise  Meats that are processed, like:  Hot dogs  Bologna  Ham  Salami  Milk products, like yogurt  Spicy foods, such as Cajun, Mexican, [place], Chadian, and Southwest foods  Sweeteners and artificial sweeteners, including:  Sugar  Honey  Fructose  Lactose  Sucrose  Gardners sweeteners  Aspartame  NutraSweet  Tomatoes, tomato products and sauces, such as pasta or pizza sauces  Tobacco  Vinegar  Salad dressings made with lemon and vinegar  Condiments made with lemon and vinegar, such as mustard and ketchup  Over-the-counter drugs that have caffeine or vitamin C, such as multivitamins  These things probably do not irritate your bladder:  Fruit, like:  Apricots  Bananas  Blueberries  Dates  Melon  Prunes  Pears  Raisins  Vegetables, like:  Avocados  Asparagus  Beets  [place] sprouts  Cabbage  Carrots  Cauliflower  Celery  Cucumbers  Eggplant  Mushrooms  Peas  Potatoes  Radishes  Spinach  Squash  Turnips  Zucchini  Milk and cheese  Beef, fish, pork,  poultry, lamb  Peanut butter  Nuts  Eggs  Helpful tips   Drink 6 to 8 glasses of water each day.  Keep a food diary to track your food and symptoms. That way you can see which food makes your bladder problems worse.  Health Maintenance  Continue to follow a healthy diet with fruits and vegetables at most meals.  Daily exercise is recommended as well as adding weights 3 times a week to maintain muscle mass and for bone health.  It is recommended you drink 6 to 8 glasses of water daily, more when you are exerting yourself or in hot weather.  Make sure you follow the health screening guidelines and keep up to date on your immunizations!

## 2024-05-29 NOTE — PROGRESS NOTES
Subjective   Patient ID: Monalisa Au is a 44 y.o. female who presents for Follow-up (Blood pressure; questions about upcoming procedure ), Abdominal Pain (Lower abdominal pain), Urinary Frequency (Going to the bathroom more frequently ), and Vaginal Discharge (White/brownish ).    HPI   Pleasant established 44 y/o female with PMH HTN, Anxiety, HSV, gestational diabetes, GERD (H pylori IgG positive s/p triple therapy resolved) here for follow up on blood pressures.     Norvasc restarted in November. Had self stopped hoping to manage with healthy lifestyle.   Today pressures are good, brings home readings averaging 120's/70's   Discussed healthy lifestyle,  avoid sodium increase exercise     Snores-reports daytime sleepiness, told she can be heard throughout the house, unsure if she has apnea episodes at night. Sleep medicine referral not followed up on yet, discussed    Urinary frequency- denies dysuria, endorses pelvic discomfort, discharge, frequency, no flank pain, odor. Is currently following with Gyn with same c/o.  Scheduled for Hysteroscopy/Polypectomy 6/21/2024 with Dr Ge  Will obtain UA today    Review of Systems  Review of Systems   Constitutional: Negative.    HENT: Negative.     Respiratory: Negative.     Cardiovascular: Negative.    Gastrointestinal: Negative.    Genitourinary: HPI    All other systems reviewed and are negative.    Objective   /68 (BP Location: Left arm, Patient Position: Sitting)   Pulse 89   Temp 36.5 °C (97.7 °F)   Wt 78.7 kg (173 lb 6.4 oz)   LMP 05/05/2024   SpO2 97%   BMI 31.72 kg/m²     Physical Exam  Physical Exam  Vitals reviewed.   Constitutional:       General: She is active.   HENT:      Head: Normocephalic and atraumatic.   Cardiovascular:      Rate and Rhythm: Normal rate and regular rhythm.   Pulmonary:      Effort: Pulmonary effort is normal.      Breath sounds: Normal breath sounds.   Musculoskeletal:         General: Normal range of motion.       Cervical back: Neck supple.   Neurological:      General: No focal deficit present.      Mental Status: She is alert.     Assessment/Plan   Problem List Items Addressed This Visit             ICD-10-CM    Benign hypertension - Primary I10     Stable  Continue Norvasc         Relevant Orders    Referral to Nutrition Services    Snores R06.83     Sleep study scheduled          Other Visit Diagnoses         Codes    Urinary frequency     R35.0    Relevant Orders    POCT UA (nonautomated) manually resulted (Completed)    Urinalysis with Reflex Culture and Microscopic (Completed)    Microscopic Only, Urine (Completed)    Urine Culture        Handouts provided and reviewed with patient, discussed

## 2024-05-30 LAB
BACTERIA UR CULT: NORMAL
HOLD SPECIMEN: NORMAL

## 2024-06-10 ENCOUNTER — ANESTHESIA EVENT (OUTPATIENT)
Dept: OPERATING ROOM | Facility: HOSPITAL | Age: 44
End: 2024-06-10
Payer: COMMERCIAL

## 2024-06-13 ENCOUNTER — LAB (OUTPATIENT)
Dept: LAB | Facility: LAB | Age: 44
End: 2024-06-13
Payer: COMMERCIAL

## 2024-06-13 DIAGNOSIS — N93.9 ABNORMAL UTERINE BLEEDING (AUB): ICD-10-CM

## 2024-06-13 LAB
ABO GROUP (TYPE) IN BLOOD: NORMAL
ANION GAP SERPL CALC-SCNC: 12 MMOL/L (ref 10–20)
ANTIBODY SCREEN: NORMAL
BUN SERPL-MCNC: 11 MG/DL (ref 6–23)
CALCIUM SERPL-MCNC: 9.6 MG/DL (ref 8.6–10.3)
CHLORIDE SERPL-SCNC: 102 MMOL/L (ref 98–107)
CO2 SERPL-SCNC: 27 MMOL/L (ref 21–32)
CREAT SERPL-MCNC: 0.68 MG/DL (ref 0.5–1.05)
EGFRCR SERPLBLD CKD-EPI 2021: >90 ML/MIN/1.73M*2
ERYTHROCYTE [DISTWIDTH] IN BLOOD BY AUTOMATED COUNT: 11.5 % (ref 11.5–14.5)
GLUCOSE SERPL-MCNC: 115 MG/DL (ref 74–99)
HCT VFR BLD AUTO: 34.7 % (ref 36–46)
HGB BLD-MCNC: 11.7 G/DL (ref 12–16)
MCH RBC QN AUTO: 29.6 PG (ref 26–34)
MCHC RBC AUTO-ENTMCNC: 33.7 G/DL (ref 32–36)
MCV RBC AUTO: 88 FL (ref 80–100)
NRBC BLD-RTO: 0 /100 WBCS (ref 0–0)
PLATELET # BLD AUTO: 329 X10*3/UL (ref 150–450)
POTASSIUM SERPL-SCNC: 3.9 MMOL/L (ref 3.5–5.3)
RBC # BLD AUTO: 3.95 X10*6/UL (ref 4–5.2)
RH FACTOR (ANTIGEN D): NORMAL
SODIUM SERPL-SCNC: 137 MMOL/L (ref 136–145)
WBC # BLD AUTO: 6.6 X10*3/UL (ref 4.4–11.3)

## 2024-06-13 PROCEDURE — 86901 BLOOD TYPING SEROLOGIC RH(D): CPT

## 2024-06-13 PROCEDURE — 86900 BLOOD TYPING SEROLOGIC ABO: CPT

## 2024-06-13 PROCEDURE — 36415 COLL VENOUS BLD VENIPUNCTURE: CPT

## 2024-06-13 PROCEDURE — 85027 COMPLETE CBC AUTOMATED: CPT

## 2024-06-13 PROCEDURE — 86850 RBC ANTIBODY SCREEN: CPT

## 2024-06-13 PROCEDURE — 80048 BASIC METABOLIC PNL TOTAL CA: CPT

## 2024-06-20 SDOH — HEALTH STABILITY: MENTAL HEALTH: CURRENT SMOKER: 0

## 2024-06-20 NOTE — PREPROCEDURE INSTRUCTIONS
Current Medications   Medication Instructions    amLODIPine (Norvasc) 5 mg tablet Continue until night before surgery    famotidine (Pepcid) 20 mg tablet Continue until night before surgery            NPO Instructions:    Do not eat any food after midnight the night before your surgery/procedure.  You may have up to 13.5 ounces of clear liquids until 10:45 AM. This includes water, black tea/coffee, (no milk or cream) apple juice and electrolyte drinks (Gatorade).    Additional Instructions:     Day of Surgery:    Enter through the Main Entrance of [facility], located at [address]. Arrive at 12:45 PM for 2:10 PM surgery. Proceed to registration, located in the back right hand corner. You will need your ID and insurance card for registration. Please ensure you have a responsible adult to drive you home.     Take a shower the morning of or night before your procedure. After you shower avoid lotions, powders, deodorants or anything applied to the skin. If you wear contacts or glasses, wear the glasses. If you do not have glasses, please bring a case for your contacts. You may wear hearing aids and dentures, bring a case for them or we will provide one. Make sure you wear something loose and comfortable. Keep in mind your surgery type and wear something that will accommodate incisions or bandages. Please remove all jewelry.     For further questions Kishore HICKS can be contacted at [phone] between 7AM-3PM.

## 2024-06-20 NOTE — ANESTHESIA PREPROCEDURE EVALUATION
Patient: Monalisa Au    Procedure Information       Date/Time: 06/21/24 1410    Procedure: OPERATIVE HYSTEROSCOPY/ POLYPECTOMY (Bilateral)    Location: [facility] 09 / [facility]    Surgeons: Roland Ge MD            Relevant Problems   Cardiac   (+) Benign hypertension      Neuro   (+) Anxiety and depression      GI   (+) Gastroesophageal reflux disease without esophagitis      Endocrine   (+) Gestational diabetes (HHS-HCC)      ID   (+) Genital herpes      GYN   (+) Abnormal uterine bleeding (AUB)       Clinical information reviewed:    Allergies  Meds  Problems              NPO Detail:  No data recorded     Physical Exam    Airway  Mallampati: III  TM distance: <3 FB  Neck ROM: full     Cardiovascular - normal exam  Rhythm: regular  Rate: normal     Dental - normal exam     Pulmonary - normal exam     Abdominal            Anesthesia Plan    History of general anesthesia?: yes  History of complications of general anesthesia?: no    ASA 2     general     The patient is not a current smoker.    intravenous induction   Postoperative administration of opioids is intended.  Trial extubation is planned.  Anesthetic plan and risks discussed with patient.  Use of blood products discussed with patient who consented to blood products.    Plan discussed with CRNA.

## 2024-06-21 ENCOUNTER — ANESTHESIA (OUTPATIENT)
Dept: OPERATING ROOM | Facility: HOSPITAL | Age: 44
End: 2024-06-21
Payer: COMMERCIAL

## 2024-06-21 ENCOUNTER — HOSPITAL ENCOUNTER (OUTPATIENT)
Facility: HOSPITAL | Age: 44
Setting detail: OUTPATIENT SURGERY
Discharge: HOME | End: 2024-06-21
Attending: OBSTETRICS & GYNECOLOGY | Admitting: OBSTETRICS & GYNECOLOGY
Payer: COMMERCIAL

## 2024-06-21 ENCOUNTER — HOSPITAL ENCOUNTER (OUTPATIENT)
Dept: CARDIOLOGY | Facility: HOSPITAL | Age: 44
Discharge: HOME | End: 2024-06-21
Payer: COMMERCIAL

## 2024-06-21 VITALS
BODY MASS INDEX: 31.73 KG/M2 | OXYGEN SATURATION: 97 % | HEART RATE: 70 BPM | WEIGHT: 173.5 LBS | TEMPERATURE: 97.7 F | RESPIRATION RATE: 18 BRPM | SYSTOLIC BLOOD PRESSURE: 139 MMHG | DIASTOLIC BLOOD PRESSURE: 78 MMHG

## 2024-06-21 DIAGNOSIS — G89.18 POSTOPERATIVE PAIN: ICD-10-CM

## 2024-06-21 DIAGNOSIS — Z41.9 SURGERY, ELECTIVE: Primary | ICD-10-CM

## 2024-06-21 DIAGNOSIS — N93.9 ABNORMAL UTERINE BLEEDING (AUB): ICD-10-CM

## 2024-06-21 LAB
ABO GROUP (TYPE) IN BLOOD: NORMAL
RH FACTOR (ANTIGEN D): NORMAL

## 2024-06-21 PROCEDURE — 2720000007 HC OR 272 NO HCPCS: Performed by: OBSTETRICS & GYNECOLOGY

## 2024-06-21 PROCEDURE — 3600000008 HC OR TIME - EACH INCREMENTAL 1 MINUTE - PROCEDURE LEVEL THREE: Performed by: OBSTETRICS & GYNECOLOGY

## 2024-06-21 PROCEDURE — 2500000002 HC RX 250 W HCPCS SELF ADMINISTERED DRUGS (ALT 637 FOR MEDICARE OP, ALT 636 FOR OP/ED): Performed by: OBSTETRICS & GYNECOLOGY

## 2024-06-21 PROCEDURE — 3700000002 HC GENERAL ANESTHESIA TIME - EACH INCREMENTAL 1 MINUTE: Performed by: OBSTETRICS & GYNECOLOGY

## 2024-06-21 PROCEDURE — 88305 TISSUE EXAM BY PATHOLOGIST: CPT | Mod: TC,PARLAB | Performed by: OBSTETRICS & GYNECOLOGY

## 2024-06-21 PROCEDURE — 58558 HYSTEROSCOPY BIOPSY: CPT | Performed by: OBSTETRICS & GYNECOLOGY

## 2024-06-21 PROCEDURE — 36415 COLL VENOUS BLD VENIPUNCTURE: CPT | Performed by: OBSTETRICS & GYNECOLOGY

## 2024-06-21 PROCEDURE — 2500000001 HC RX 250 WO HCPCS SELF ADMINISTERED DRUGS (ALT 637 FOR MEDICARE OP): Performed by: OBSTETRICS & GYNECOLOGY

## 2024-06-21 PROCEDURE — 3700000001 HC GENERAL ANESTHESIA TIME - INITIAL BASE CHARGE: Performed by: OBSTETRICS & GYNECOLOGY

## 2024-06-21 PROCEDURE — 2500000004 HC RX 250 GENERAL PHARMACY W/ HCPCS (ALT 636 FOR OP/ED): Performed by: OBSTETRICS & GYNECOLOGY

## 2024-06-21 PROCEDURE — 3600000003 HC OR TIME - INITIAL BASE CHARGE - PROCEDURE LEVEL THREE: Performed by: OBSTETRICS & GYNECOLOGY

## 2024-06-21 PROCEDURE — 7100000010 HC PHASE TWO TIME - EACH INCREMENTAL 1 MINUTE: Performed by: OBSTETRICS & GYNECOLOGY

## 2024-06-21 PROCEDURE — 7100000001 HC RECOVERY ROOM TIME - INITIAL BASE CHARGE: Performed by: OBSTETRICS & GYNECOLOGY

## 2024-06-21 PROCEDURE — 7100000002 HC RECOVERY ROOM TIME - EACH INCREMENTAL 1 MINUTE: Performed by: OBSTETRICS & GYNECOLOGY

## 2024-06-21 PROCEDURE — 93005 ELECTROCARDIOGRAM TRACING: CPT

## 2024-06-21 PROCEDURE — A4217 STERILE WATER/SALINE, 500 ML: HCPCS | Performed by: OBSTETRICS & GYNECOLOGY

## 2024-06-21 PROCEDURE — 2500000004 HC RX 250 GENERAL PHARMACY W/ HCPCS (ALT 636 FOR OP/ED)

## 2024-06-21 PROCEDURE — 88305 TISSUE EXAM BY PATHOLOGIST: CPT | Performed by: STUDENT IN AN ORGANIZED HEALTH CARE EDUCATION/TRAINING PROGRAM

## 2024-06-21 PROCEDURE — 2500000005 HC RX 250 GENERAL PHARMACY W/O HCPCS

## 2024-06-21 PROCEDURE — 7100000009 HC PHASE TWO TIME - INITIAL BASE CHARGE: Performed by: OBSTETRICS & GYNECOLOGY

## 2024-06-21 RX ORDER — SCOLOPAMINE TRANSDERMAL SYSTEM 1 MG/1
1 PATCH, EXTENDED RELEASE TRANSDERMAL ONCE
Status: DISCONTINUED | OUTPATIENT
Start: 2024-06-21 | End: 2024-06-21 | Stop reason: HOSPADM

## 2024-06-21 RX ORDER — DOCUSATE SODIUM 100 MG/1
100 CAPSULE, LIQUID FILLED ORAL 2 TIMES DAILY
Qty: 60 CAPSULE | Refills: 0 | Status: SHIPPED | OUTPATIENT
Start: 2024-06-21 | End: 2024-07-21

## 2024-06-21 RX ORDER — FENTANYL CITRATE 50 UG/ML
INJECTION, SOLUTION INTRAMUSCULAR; INTRAVENOUS AS NEEDED
Status: DISCONTINUED | OUTPATIENT
Start: 2024-06-21 | End: 2024-06-21

## 2024-06-21 RX ORDER — ACETAMINOPHEN 325 MG/1
975 TABLET ORAL ONCE
Status: COMPLETED | OUTPATIENT
Start: 2024-06-21 | End: 2024-06-21

## 2024-06-21 RX ORDER — SODIUM CHLORIDE 0.9 G/100ML
IRRIGANT IRRIGATION AS NEEDED
Status: DISCONTINUED | OUTPATIENT
Start: 2024-06-21 | End: 2024-06-21 | Stop reason: HOSPADM

## 2024-06-21 RX ORDER — MEPERIDINE HYDROCHLORIDE 25 MG/ML
12.5 INJECTION INTRAMUSCULAR; INTRAVENOUS; SUBCUTANEOUS EVERY 10 MIN PRN
Status: DISCONTINUED | OUTPATIENT
Start: 2024-06-21 | End: 2024-06-21 | Stop reason: HOSPADM

## 2024-06-21 RX ORDER — LIDOCAINE HYDROCHLORIDE 10 MG/ML
0.1 INJECTION INFILTRATION; PERINEURAL ONCE
Status: DISCONTINUED | OUTPATIENT
Start: 2024-06-21 | End: 2024-06-21 | Stop reason: HOSPADM

## 2024-06-21 RX ORDER — KETOROLAC TROMETHAMINE 30 MG/ML
30 INJECTION, SOLUTION INTRAMUSCULAR; INTRAVENOUS ONCE AS NEEDED
Status: DISCONTINUED | OUTPATIENT
Start: 2024-06-21 | End: 2024-06-21 | Stop reason: HOSPADM

## 2024-06-21 RX ORDER — ONDANSETRON HYDROCHLORIDE 2 MG/ML
4 INJECTION, SOLUTION INTRAVENOUS ONCE AS NEEDED
Status: DISCONTINUED | OUTPATIENT
Start: 2024-06-21 | End: 2024-06-21 | Stop reason: HOSPADM

## 2024-06-21 RX ORDER — NAPROXEN 500 MG/1
500 TABLET ORAL 2 TIMES DAILY
Qty: 6 TABLET | Refills: 0 | Status: SHIPPED | OUTPATIENT
Start: 2024-06-21 | End: 2024-06-24

## 2024-06-21 RX ORDER — ALBUTEROL SULFATE 0.83 MG/ML
2.5 SOLUTION RESPIRATORY (INHALATION) ONCE AS NEEDED
Status: DISCONTINUED | OUTPATIENT
Start: 2024-06-21 | End: 2024-06-21 | Stop reason: HOSPADM

## 2024-06-21 RX ORDER — TRANEXAMIC ACID 650 MG/1
1300 TABLET ORAL ONCE
Status: COMPLETED | OUTPATIENT
Start: 2024-06-21 | End: 2024-06-21

## 2024-06-21 RX ORDER — CELECOXIB 100 MG/1
400 CAPSULE ORAL ONCE
Status: COMPLETED | OUTPATIENT
Start: 2024-06-21 | End: 2024-06-21

## 2024-06-21 RX ORDER — PROPOFOL 10 MG/ML
INJECTION, EMULSION INTRAVENOUS AS NEEDED
Status: DISCONTINUED | OUTPATIENT
Start: 2024-06-21 | End: 2024-06-21

## 2024-06-21 RX ORDER — DIPHENHYDRAMINE HYDROCHLORIDE 50 MG/ML
12.5 INJECTION INTRAMUSCULAR; INTRAVENOUS ONCE AS NEEDED
Status: DISCONTINUED | OUTPATIENT
Start: 2024-06-21 | End: 2024-06-21 | Stop reason: HOSPADM

## 2024-06-21 RX ORDER — SODIUM CHLORIDE, SODIUM LACTATE, POTASSIUM CHLORIDE, CALCIUM CHLORIDE 600; 310; 30; 20 MG/100ML; MG/100ML; MG/100ML; MG/100ML
100 INJECTION, SOLUTION INTRAVENOUS CONTINUOUS
Status: DISCONTINUED | OUTPATIENT
Start: 2024-06-21 | End: 2024-06-21 | Stop reason: HOSPADM

## 2024-06-21 RX ORDER — GABAPENTIN 300 MG/1
600 CAPSULE ORAL ONCE
Status: COMPLETED | OUTPATIENT
Start: 2024-06-21 | End: 2024-06-21

## 2024-06-21 RX ORDER — ACETAMINOPHEN 325 MG/1
650 TABLET ORAL EVERY 4 HOURS PRN
Status: DISCONTINUED | OUTPATIENT
Start: 2024-06-21 | End: 2024-06-21 | Stop reason: HOSPADM

## 2024-06-21 RX ORDER — ONDANSETRON HYDROCHLORIDE 2 MG/ML
INJECTION, SOLUTION INTRAVENOUS AS NEEDED
Status: DISCONTINUED | OUTPATIENT
Start: 2024-06-21 | End: 2024-06-21

## 2024-06-21 RX ORDER — BUPIVACAINE HYDROCHLORIDE 2.5 MG/ML
INJECTION, SOLUTION EPIDURAL; INFILTRATION; INTRACAUDAL AS NEEDED
Status: DISCONTINUED | OUTPATIENT
Start: 2024-06-21 | End: 2024-06-21 | Stop reason: HOSPADM

## 2024-06-21 RX ORDER — DEXAMETHASONE SODIUM PHOSPHATE 10 MG/ML
6 INJECTION INTRAMUSCULAR; INTRAVENOUS ONCE
Status: DISCONTINUED | OUTPATIENT
Start: 2024-06-21 | End: 2024-06-21 | Stop reason: HOSPADM

## 2024-06-21 RX ORDER — IPRATROPIUM BROMIDE 0.5 MG/2.5ML
500 SOLUTION RESPIRATORY (INHALATION) ONCE
Status: DISCONTINUED | OUTPATIENT
Start: 2024-06-21 | End: 2024-06-21 | Stop reason: HOSPADM

## 2024-06-21 RX ORDER — LIDOCAINE HCL/PF 100 MG/5ML
SYRINGE (ML) INTRAVENOUS AS NEEDED
Status: DISCONTINUED | OUTPATIENT
Start: 2024-06-21 | End: 2024-06-21

## 2024-06-21 ASSESSMENT — COLUMBIA-SUICIDE SEVERITY RATING SCALE - C-SSRS
2. HAVE YOU ACTUALLY HAD ANY THOUGHTS OF KILLING YOURSELF?: NO
1. IN THE PAST MONTH, HAVE YOU WISHED YOU WERE DEAD OR WISHED YOU COULD GO TO SLEEP AND NOT WAKE UP?: NO
6. HAVE YOU EVER DONE ANYTHING, STARTED TO DO ANYTHING, OR PREPARED TO DO ANYTHING TO END YOUR LIFE?: NO

## 2024-06-21 ASSESSMENT — PAIN SCALES - GENERAL
PAINLEVEL_OUTOF10: 0 - NO PAIN
PAINLEVEL_OUTOF10: 0 - NO PAIN
PAIN_LEVEL: 0
PAINLEVEL_OUTOF10: 0 - NO PAIN

## 2024-06-21 ASSESSMENT — PAIN - FUNCTIONAL ASSESSMENT
PAIN_FUNCTIONAL_ASSESSMENT: 0-10

## 2024-06-21 NOTE — OP NOTE
Date: 2024  OR Location: [facility] OR    Name: Monalisa Au, : 1980, Age: 44 y.o., MRN: [id number], Sex: female    Diagnosis  * No surgery found * Post-op Diagnosis     * Abnormal uterine bleeding (AUB) [N93.9]     Procedures  OPERATIVE HYSTEROSCOPY/ POLYPECTOMY  32526 - [place] HYSTEROSCOPY BX ENDOMETRIUM&/POLYPC W/WO D&C        Surgeons      * Roland Ge - Primary    Resident/Fellow/Other Assistant:  Surgeons and Role:  * No surgeons found with a matching role *    Procedure Summary  Anesthesia: General  ASA: II  Anesthesia Staff: Anesthesiologist: Kalyan Massey MD  CRNA: RO Verdugo-NAKUL, DANO  SRNA: Monalisa Garcia    Estimated Blood Loss: < 50 mL     Findings:  2 endometrial polyps    Specimens:  endometrial polyps    Procedure:           OPERATIVE PROCEDURE:   Patient was taken the operating room after informed consent was obtained.  As placed and anesthesia  Placed lithotomy position in yellowfin stirrups. An exam under anesthesia was performed. She was prepped and draped in normal sterile fashion. Timeout performed. Preoperatively voided. Speculum was placed into the vagina.  A paracervical block was preformed.  Vaginoscopically a hysteroscope was introduced in the uterine cavity. Cavity was assessed photos were taken. The resectoscope device was placed in to the uterine cavity and under direct visualization the tissue was excised. The cavity was found to have limited bleeding.  Sponge lap needle counts were reported as correct. Patient tolerated the procedure well and was transferred to PACU in stable condition.      Roland Ge MD

## 2024-06-21 NOTE — ANESTHESIA PROCEDURE NOTES
Airway  Date/Time: 6/21/2024 1:49 PM  Urgency: elective    Airway not difficult    Staffing  Performed: [facility]   Authorized by: Kalyan Massey MD    Performed by: Monalisa Garcia  Patient location during procedure: OR    Indications and Patient Condition  Indications for airway management: anesthesia  Spontaneous ventilation: present  Sedation level: deep  Preoxygenated: yes  MILS not maintained throughout  Mask difficulty assessment: 1 - vent by mask    Final Airway Details  Final airway type: mask

## 2024-06-21 NOTE — H&P
History Of Present Illness  Monalisa Au is a 44 y.o. female presenting with Abnormal uterine bleeding (AUB) [N93.9]Pre-op Diagnosis     * Abnormal uterine bleeding (AUB) [N93.9]     Past Medical History  No past medical history on file.    Surgical History  Past Surgical History:   Procedure Laterality Date    HYSTERECTOMY  05/2014        Social History  She reports that she has never smoked. She has never used smokeless tobacco. She reports that she does not drink alcohol and does not use drugs.    Family History  Family History   Problem Relation Name Age of Onset    Ovarian cancer Half-Sister          Allergies  Patient has no known allergies.    Review of systems   12 point review of systems was performed and noncontributory    Physical exam  General: Appears stated age, no acute distress   Head: NCAT  Skin: Not diaphoretic, no flushing,   Eye: PERRL, EOMI   Respiratory: No respiratory distress or shortness of breath   Musculoskeletal:  BLE and BUE movement intact   Neuro: normal speech, no gait abnormalities noted  Psych: normal affect     Last Recorded Vitals  Blood pressure (!) 142/96, pulse 87, temperature 36 °C (96.8 °F), temperature source Temporal, resp. rate 18, weight 78.7 kg (173 lb 8 oz), last menstrual period 06/03/2024, SpO2 98%.    Relevant Results           Assessment/Plan       [place] HYSTEROSCOPY BX ENDOMETRIUM&/POLYPC W/WO D&C [42884] (OPERATIVE HYSTEROSCOPY/ POLYPECTOMY)         Roland Ge MD

## 2024-06-21 NOTE — ANESTHESIA POSTPROCEDURE EVALUATION
Patient: Monalisa Au    Procedure Summary       Date: 06/21/24 Room / Location: [facility] 09 / [facility]    Anesthesia Start: 1338 Anesthesia Stop:     Procedure: OPERATIVE HYSTEROSCOPY/ POLYPECTOMY (Bilateral) Diagnosis:       Abnormal uterine bleeding (AUB)      (Abnormal uterine bleeding (AUB) [N93.9])    Surgeons: Roland Ge MD Responsible Provider: Kalyan Massey MD    Anesthesia Type: general ASA Status: 2            Anesthesia Type: general    Vitals Value Taken Time   /76 06/21/24 1411   Temp 36.6 06/21/24 1412   Pulse 78 06/21/24 1411   Resp 16 06/21/24 1412   SpO2 95 % 06/21/24 1411   Vitals shown include unfiled device data.    Anesthesia Post Evaluation    Patient location during evaluation: PACU  Patient participation: complete - patient participated  Level of consciousness: awake and alert  Pain score: 0  Pain management: adequate  Multimodal analgesia pain management approach  Airway patency: patent  Two or more strategies used to mitigate risk of obstructive sleep apnea  Cardiovascular status: acceptable and hemodynamically stable  Respiratory status: acceptable  Hydration status: acceptable  Postoperative Nausea and Vomiting: none      There were no known notable events for this encounter.

## 2024-06-25 LAB
ATRIAL RATE: 77 BPM
P AXIS: 66 DEGREES
P OFFSET: 198 MS
P ONSET: 138 MS
PR INTERVAL: 160 MS
Q ONSET: 218 MS
QRS COUNT: 12 BEATS
QRS DURATION: 90 MS
QT INTERVAL: 402 MS
QTC CALCULATION(BAZETT): 454 MS
QTC FREDERICIA: 436 MS
R AXIS: 49 DEGREES
T AXIS: 35 DEGREES
T OFFSET: 419 MS
VENTRICULAR RATE: 77 BPM

## 2024-07-01 LAB
ATRIAL RATE: 77 BPM
LABORATORY COMMENT REPORT: NORMAL
P AXIS: 66 DEGREES
P OFFSET: 198 MS
P ONSET: 138 MS
PATH REPORT.FINAL DX SPEC: NORMAL
PATH REPORT.GROSS SPEC: NORMAL
PATH REPORT.RELEVANT HX SPEC: NORMAL
PATH REPORT.TOTAL CANCER: NORMAL
PR INTERVAL: 160 MS
Q ONSET: 218 MS
QRS COUNT: 12 BEATS
QRS DURATION: 90 MS
QT INTERVAL: 402 MS
QTC CALCULATION(BAZETT): 454 MS
QTC FREDERICIA: 436 MS
R AXIS: 49 DEGREES
T AXIS: 35 DEGREES
T OFFSET: 419 MS
VENTRICULAR RATE: 77 BPM

## 2024-08-08 ENCOUNTER — APPOINTMENT (OUTPATIENT)
Dept: SLEEP MEDICINE | Facility: CLINIC | Age: 44
End: 2024-08-08
Payer: COMMERCIAL

## 2024-08-08 DIAGNOSIS — G47.30 SLEEP DISORDER BREATHING: Primary | ICD-10-CM

## 2024-08-08 DIAGNOSIS — R06.81 APNEA: ICD-10-CM

## 2024-08-08 DIAGNOSIS — R06.83 SNORES: ICD-10-CM

## 2024-08-08 PROCEDURE — 99244 OFF/OP CNSLTJ NEW/EST MOD 40: CPT | Performed by: GENERAL PRACTICE

## 2024-08-08 NOTE — PROGRESS NOTES
Patient: Monalisa Au    [id number]  : 1980 -- AGE 44 y.o.    Provider: Roosevelt White DO     [facility]   Service Date: 2024              [facility]  New Visit Note      Virtual or Telephone Consent  An interactive audio and video telecommunication system which permits real time communications between the patient (at the originating site) and provider (at the distant site) was utilized to provide this telehealth service.     Verbal consent was requested and obtained from Monalisa Au on this date, 24 for a telehealth visit.     HISTORY OF PRESENT ILLNESS     The patient's referring provider is: Lu Brown,*    HISTORY OF PRESENT ILLNESS   Monalisa Au is a 44 y.o. female who presents to a [facility] for a sleep medicine evaluation with concerns of suspected sleep apnea    PAST SLEEP HISTORY    Pmhx includes HTN, anxiety/ depression not on meds     CURRENT HISTORY    On today's visit, 2024, the patient reports that she has been snoring for years. She has not tried any intervention.     Sleep schedule  on weekdays / work days:  Usual Bedtime: 11pm-12am  Sleep latency: 15 min  Wake time : 5 am  Total sleep time average/day: 5 -6 hours/day  Awakenings: 1x per night, nocturia, short.   Naps: no      Sleep schedule  on weekends/non work days :  Usual Bedtime:   12am  Wake time : 6:30-7am    Sleep aids: no  Stimulants: no    Occupation: billing follow up, desk job, work from home.     Preferred sleeping position: SLEEP POSITION: sidelying    Sleep-related ROS:    Snoring:  y  Witnessed apneas:   n     Gasping/ choking: n     Am Dry mouth:   y          Nasal congestion: n        am headaches: y    Sleep is described as unrefreshing.     Daytime sleepiness: n  Fatigue or decreased energy: y  Difficulty remembering things in daytime: n  Difficulty staying focused in daytime: y  Irritable during  the day: n    Drowsy driving: n  Hx of car accident: n  Near-miss Car accident: n      RLS screen:  RLSSCREEN: - Sensations: Patient does not have unusual sensations in their extremities that cause an urge to move them     Sleep-related behaviors: DENIES      ESS: No data recorded     REVIEW OF SYSTEMS     REVIEW OF SYSTEMS  Review of Systems   All other systems reviewed and are negative.      ALLERGIES AND MEDICATIONS     ALLERGIES  No Known Allergies    MEDICATIONS  Current Outpatient Medications   Medication Sig Dispense Refill    amLODIPine (Norvasc) 5 mg tablet Take 1 tablet (5 mg) by mouth once daily. 30 tablet 5    famotidine (Pepcid) 20 mg tablet Take 1 tablet (20 mg) by mouth as needed at bedtime.       No current facility-administered medications for this visit.         PAST HISTORY     PAST MEDICAL HISTORY  She  has no past medical history on file.       PAST SURGICAL HISTORY:  Past Surgical History:   Procedure Laterality Date    HYSTERECTOMY  05/2014       FAMILY HISTORY  Family History   Problem Relation Name Age of Onset    Ovarian cancer Half-Sister       DOES/DOES NOT EC: does have a family history of sleep disorder.    Aunt and brothers with hx of sleep apnea.     SOCIAL HISTORY  She  reports that she has never smoked. She has never used smokeless tobacco. She reports that she does not drink alcohol and does not use drugs.     Caffeine consumption: 1 cup coffee in am; sometimes ice tea / Dr. Pepper with dinner.     Smoking: n  Marijuana: n  Other drugs: n      PHYSICAL EXAM     VITAL SIGNS: There were no vitals taken for this visit.     PREVIOUS WEIGHTS:  Wt Readings from Last 3 Encounters:   06/07/24 78.7 kg (173 lb 8 oz)   05/29/24 78.7 kg (173 lb 6.4 oz)   05/01/24 78 kg (172 lb)       Physical Exam  CONSTITUTIONAL: Patient appears well developed and well nourished.   GENERAL: This is a healthy appearing patient . The patient is alert and appropriately verbally conversant   FACE: The face was  inspected and no cutaneous masses or lesions were visualized.   EYES: Extra-ocular muscle function was intact.   ORAL CAVITY: Examination of the oral cavity revealed no mass lesions nor infection.   EARS: Examination of the ears revealed that the auricles were normally formed with no lesions.   NECK: No skin lesions or inflammatory processes were detected on visual inspection.  CARDIOVASCULAR: Peripheral perfusion intact, no evidence of cyanosis, or clubbing.   RESPIRATORY: Normal inspiration and expiration and chest wall expansion, no use of accessory muscles to breathe, no stridor.  NEUROLOGICAL: Mentation is clear. Alert and oriented to time, place, and person. Answering questions appropriately.  PSYCHIATRIC: Normal affect and appropriate behavior. Mood is without obvious agitation or disturbance.     Modified Mallampati: 3    RESULTS/DATA           ASSESSMENT/PLAN     Ms. Au is a 44 y.o. female and  has no past medical history on file. She was referred to the [facility] for evaluation of suspected sleep apnea.     Problem List Items Addressed This Visit       Snores     Other Visit Diagnoses       Apnea                Problem List and Orders    Pmhx includes HTN, anxiety/ depression.     1- sleep disorder breathing   Symptoms include snoring, night time awakening, nocturia, un-refreshing sleep.     Ordered sleep study    -do not drive or operate heavy machinery if drowsy.  -avoid sedating substances/ medication, alcohol, illicit drugs and tobacco.      2- Obesity  counseled on eating a healthy diet and exercising as tolerated.      Follow up after sleep study or sooner as needed.

## 2024-09-12 ENCOUNTER — APPOINTMENT (OUTPATIENT)
Dept: SLEEP MEDICINE | Facility: CLINIC | Age: 44
End: 2024-09-12
Payer: COMMERCIAL

## 2024-10-23 DIAGNOSIS — I10 BENIGN HYPERTENSION: ICD-10-CM

## 2024-10-23 RX ORDER — AMLODIPINE BESYLATE 5 MG/1
5 TABLET ORAL DAILY
Qty: 30 TABLET | Refills: 5 | Status: SHIPPED | OUTPATIENT
Start: 2024-10-23 | End: 2025-04-21

## 2024-11-07 DIAGNOSIS — I10 BENIGN HYPERTENSION: ICD-10-CM

## 2024-11-07 RX ORDER — AMLODIPINE BESYLATE 5 MG/1
5 TABLET ORAL DAILY
Qty: 30 TABLET | Refills: 0 | Status: SHIPPED | OUTPATIENT
Start: 2024-11-07 | End: 2025-05-06

## 2024-12-19 ENCOUNTER — APPOINTMENT (OUTPATIENT)
Dept: PRIMARY CARE | Facility: CLINIC | Age: 44
End: 2024-12-19
Payer: COMMERCIAL

## (undated) DEVICE — SEAL SET, MYOSURE ROD LENS SCOPE , SINGLE USE

## (undated) DEVICE — Device

## (undated) DEVICE — BASIN KIT, SINGLE

## (undated) DEVICE — DEVICE, TISSUE REMOVAL, MYOSURE LITE (3PK)